# Patient Record
(demographics unavailable — no encounter records)

---

## 2024-10-15 NOTE — HISTORY OF PRESENT ILLNESS
[Regular Periods] : regular periods [FreeTextEntry2] : Heide is a 14 year 5 month old girl seen initially by me in 5/2024 for hair loss here for follow up. Laboratory testing done 3/2/24 showed normal prolactin, TSH, FSH and LH; total testosterone normal at 7, free testosterone normal at 1.5, SHBG low at 13, DHEAS reported as mildly elevated at 152. She had been on spironolactone, minoxidil, and biotin. She also reported increased hair growth of her upper lip, eyebrows, chest, abdomen, and back. On examination BMI was at the 76%. She was noted to have mild hirsutism and hair thinning at the top of her scalp. Testing showed normal testosterone and adrenal hormone panel.   Heide returns for follow up and reports that .    14 year 5 month old girl with mild hair loss treated with spironolactone, minoxidil, and biotin, prescribed by dermatology. Testing has shown SHBG to be low but total and free testosterone as well as adrenal hormone panel have been normal. She has very mild hirsutism on examination and BMI is in normal range. Given her regular menses and normal hormonal testing she does not have polycystic ovarian syndrome or non classic CAH.

## 2024-10-15 NOTE — CONSULT LETTER
[Dear  ___] : Dear  [unfilled], [Please see my note below.] : Please see my note below. [Sincerely,] : Sincerely, [DrManan  ___] : Dr. HERNÁNDEZ [Courtesy Letter:] : I had the pleasure of seeing your patient, [unfilled], in my office today. [FreeTextEntry3] : Mariah Dick MD

## 2024-10-15 NOTE — PAST MEDICAL HISTORY
[At Term] : at term [ Section] : by  section [Age Appropriate] : age appropriate developmental milestones met [FreeTextEntry1] : 6+ lbs [FreeTextEntry4] : NICU for hypoglycemia, respiratory distress

## 2024-10-15 NOTE — PHYSICAL EXAM
[Healthy Appearing] : healthy appearing [Well Nourished] : well nourished [Interactive] : interactive [Hirsutism] : hirsutism [Normal Appearance] : normal appearance [Well formed] : well formed [Normally Set] : normally set [Normal S1 and S2] : normal S1 and S2 [Clear to Ausculation Bilaterally] : clear to auscultation bilaterally [Abdomen Soft] : soft [Abdomen Tenderness] : non-tender [Yeyo Stage ___] : the Yeyo stage for breast development was [unfilled] [Normal] : normal  [Acanthosis Nigricans___] : no acanthosis nigricans [Murmur] : no murmurs [de-identified] : hair thinning at top of scalp [de-identified] : wearing glasses

## 2024-10-31 NOTE — PLAN
[FreeTextEntry1] : Brain MRI without contrast Naproxen 375 mg BID as needed for headaches, do not take more than 3-4 times a week discussed that there is an increased risk of stroke in people who have migraines with aura, and that risk increases if taking estrogen containing products improve hydration  Lifestyle Goals:  Regular sleep/waking times (on both weekdays and weekends) - Children 3-6yo:10-13 hrs; 6-13yo: 9-12 hrs; teens 13+: 8-10 hrs  Regular exercise - 30 mins a day, 5 days a week  Regular meals (protein rich breakfast within 30 min of waking and no skipping meals)  Stay hydrated (1 ounce/kg body weight, 8-10 cups of water per day for teens)  Can refer to www.headachereliefguide.com  for more information on healthy habits

## 2024-10-31 NOTE — HISTORY OF PRESENT ILLNESS
[Previous Imaging] : yes [FreeTextEntry1] : headaches started 2-3 years ago, nothing significant happened at the time pain located on the right or left side described as throbbing pain duration of headaches 2-3 hours frequency of headaches 2-3 times a month    associated symptoms: +nausea/vomiting, +photophobia   treated with: motrin 200mg, usually helps    aura? blurry vision on the side of the headache, right or left, lasts about 30 min    other symptoms with headaches- none   positional component? do not wake her up at night    triggers: maybe menses  previous work up: imaging- brain MRI in september 2023 but she had braces at the time blood work- none ophtho- seen last month, normal exam   episodic conditions and other pediatric relevant conditions?   previous acute medications: motrin, tylenol   previous prevention medications: none   lifestyle: 8 hours of sleep no breakfast 1 cup of water  menses? 9yo [Head Trauma] : no head trauma [Infections] : no infections [Stressors] : no stressors

## 2024-10-31 NOTE — ASSESSMENT
[FreeTextEntry1] : 13yo female with no significant pmh who is here for initial evaluation of headaches. Headaches are meeting criteria for migraines with visual aura. Neurological exam non focal. Discussed improving lifestyle, hydration.

## 2025-01-02 NOTE — HISTORY OF PRESENT ILLNESS
[FreeTextEntry1] : follow up 1/2/25: brain MRI done, normal patient reports that she has not had any more visual auras since the last visit she is now having headaches about 1-2 times a month she is taking naproxen as needed and it is working  previous history: headaches started 2-3 years ago, nothing significant happened at the time pain located on the right or left side described as throbbing pain duration of headaches 2-3 hours frequency of headaches 2-3 times a month  associated symptoms: +nausea/vomiting, +photophobia  treated with: motrin 200mg, usually helps  aura? blurry vision on the side of the headache, right or left, lasts about 30 min  other symptoms with headaches- none  positional component? do not wake her up at night  triggers: maybe menses  previous work up: imaging- brain MRI in september 2023 but she had braces at the time blood work- none ophtho- seen last month, normal exam  episodic conditions and other pediatric relevant conditions?  previous acute medications: motrin, tylenol  previous prevention medications: none  lifestyle: 8 hours of sleep no breakfast 1 cup of water  menses? 9yo   Headache HPI   In the context of: no head trauma, no infections, no stressors   Previous Imaging: yes. Headache Description

## 2025-01-02 NOTE — PLAN
[FreeTextEntry1] : Start Magnesium 400mg nightly and Vitamin B2 (riboflavin) 400mg nightly Naproxen 375mg BID as needed for headaches, do not take more than 3-4 times a week discussed that there is an increased risk of stroke in people who have migraines with aura, and that risk increases if taking estrogen containing products make sure she always has naproxen with her at school  Lifestyle Goals:  Regular sleep/waking times (on both weekdays and weekends) - Children 3-4yo:10-13 hrs; 6-13yo: 9-12 hrs; teens 13+: 8-10 hrs  Regular exercise - 30 mins a day, 5 days a week  Regular meals (protein rich breakfast within 30 min of waking and no skipping meals)  Stay hydrated (1 ounce/kg body weight, 8-10 cups of water per day for teens)  Can refer to www.headachereliefguide.com  for more information on healthy habits

## 2025-01-02 NOTE — ASSESSMENT
[FreeTextEntry1] : 13yo female with no significant pmh who is here for follow up of migraines with visual aura. Brain MRI done at last visit normal, no more visual auras since last visit but headache frequency has increased, now happening 1-2 times a week. Will optimize prevention treatment.

## 2025-04-02 NOTE — HISTORY OF PRESENT ILLNESS
[FreeTextEntry1] : follow up 4/2/25: patient reports that overall the headaches have been well controlled but for the last week they have been more frequent, happening almost every day for the last week she continues on magnesium 400mg and vitamin b2 400mg daily patient has not had any more visual auras  previous history: follow up 1/2/25: brain MRI done, normal patient reports that she has not had any more visual auras since the last visit she is now having headaches about 1-2 times a month she is taking naproxen as needed and it is working  previous history: headaches started 2-3 years ago, nothing significant happened at the time pain located on the right or left side described as throbbing pain duration of headaches 2-3 hours frequency of headaches 2-3 times a month  associated symptoms: +nausea/vomiting, +photophobia  treated with: motrin 200mg, usually helps  aura? blurry vision on the side of the headache, right or left, lasts about 30 min  other symptoms with headaches- none  positional component? do not wake her up at night  triggers: maybe menses  previous work up: imaging- brain MRI in september 2023 but she had braces at the time blood work- none ophtho- seen last month, normal exam  episodic conditions and other pediatric relevant conditions?  previous acute medications: motrin, tylenol  previous prevention medications: none  lifestyle: 8 hours of sleep no breakfast 1 cup of water  menses? 11yo   Headache HPI In the context of: no head trauma, no infections, no stressors Previous Imaging: yes. Headache Description

## 2025-04-02 NOTE — ASSESSMENT
[FreeTextEntry1] : 13yo female with no significant pmh who is here for follow up of migraines with visual aura. Overall headache frequency has been stable until the last week when the headache frequency increased. Will trial nabumetone course to reset headaches. Continue magneaium and vitamin b2 for now. If continued increased frequency of headaches, will consider topamax.

## 2025-04-02 NOTE — PLAN
[FreeTextEntry1] : continue Magnesium 400mg nightly and Vitamin B2 (riboflavin) 400mg nightly Nabumetone 500mg BID for 5 days, do not take other anti inflammatories at the same time Naproxen 375mg BID as needed for headaches, do not take more than 3-4 times a week (knows not to take while she is on nabumetone) discussed that there is an increased risk of stroke in people who have migraines with aura, and that risk increases if taking estrogen containing products make sure she always has naproxen with her at school  Lifestyle Goals: Regular sleep/waking times (on both weekdays and weekends) - Children 3-4yo:10-13 hrs; 6-13yo: 9-12 hrs; teens 13+: 8-10 hrs Regular exercise - 30 mins a day, 5 days a week Regular meals (protein rich breakfast within 30 min of waking and no skipping meals) Stay hydrated (1 ounce/kg body weight, 8-10 cups of water per day for teens) Can refer to www.headachereliefguide.com for more information on healthy habits.